# Patient Record
Sex: FEMALE | Employment: FULL TIME | URBAN - METROPOLITAN AREA
[De-identification: names, ages, dates, MRNs, and addresses within clinical notes are randomized per-mention and may not be internally consistent; named-entity substitution may affect disease eponyms.]

---

## 2018-10-18 ENCOUNTER — OFFICE VISIT (OUTPATIENT)
Dept: FAMILY MEDICINE CLINIC | Facility: CLINIC | Age: 38
End: 2018-10-18
Payer: COMMERCIAL

## 2018-10-18 VITALS
DIASTOLIC BLOOD PRESSURE: 68 MMHG | RESPIRATION RATE: 16 BRPM | TEMPERATURE: 99 F | SYSTOLIC BLOOD PRESSURE: 100 MMHG | OXYGEN SATURATION: 98 % | HEART RATE: 91 BPM

## 2018-10-18 DIAGNOSIS — H10.33 ACUTE CONJUNCTIVITIS OF BOTH EYES, UNSPECIFIED ACUTE CONJUNCTIVITIS TYPE: ICD-10-CM

## 2018-10-18 DIAGNOSIS — J01.00 ACUTE NON-RECURRENT MAXILLARY SINUSITIS: Primary | ICD-10-CM

## 2018-10-18 PROCEDURE — 99202 OFFICE O/P NEW SF 15 MIN: CPT | Performed by: PHYSICIAN ASSISTANT

## 2018-10-18 RX ORDER — ALBUTEROL SULFATE 90 UG/1
1-2 AEROSOL, METERED RESPIRATORY (INHALATION) EVERY 6 HOURS PRN
Qty: 1 INHALER | Refills: 0 | Status: SHIPPED | OUTPATIENT
Start: 2018-10-18

## 2018-10-18 RX ORDER — TOBRAMYCIN 3 MG/ML
2 SOLUTION/ DROPS OPHTHALMIC EVERY 6 HOURS
Qty: 5 ML | Refills: 0 | Status: SHIPPED | OUTPATIENT
Start: 2018-10-18 | End: 2019-01-13 | Stop reason: ALTCHOICE

## 2018-10-18 RX ORDER — AZITHROMYCIN 250 MG/1
TABLET, FILM COATED ORAL
Qty: 6 TABLET | Refills: 0 | Status: SHIPPED | OUTPATIENT
Start: 2018-10-18 | End: 2019-01-13 | Stop reason: ALTCHOICE

## 2018-10-18 RX ORDER — FLUTICASONE PROPIONATE 50 MCG
2 SPRAY, SUSPENSION (ML) NASAL DAILY
Qty: 1 INHALER | Refills: 0 | Status: SHIPPED | OUTPATIENT
Start: 2018-10-18 | End: 2019-01-13 | Stop reason: ALTCHOICE

## 2018-10-18 NOTE — PATIENT INSTRUCTIONS
-Sudafed  -Flonase  -Push fluids  -ER with worsening symptoms      Acute Bacterial Rhinosinusitis (ABRS)    Acute bacterial rhinosinusitis (ABRS) is an infection of your nasal cavity and sinuses. It’s caused by bacteria.  Acute means that you’ve had symptom corticosteroid medicine. Drops or spray used in the nose can lessen swelling and congestion. · Over-the-counter pain medicine. This is to lessen sinus pain and pressure. · Nasal decongestant medicine. Spray or drops may help to lessen congestion.  Do not

## 2018-10-18 NOTE — PROGRESS NOTES
CHIEF COMPLAINT:   Patient presents with:  Sinus Problem    HPI:   Ion Foster is a 45year old female who presents for sinus sxs for  Week and half. Patient also report crusty and red eyes x 2 days.    Patient reports nasal congestion,  Maxillary si HEAD: atraumatic, normocephalic.  + tenderness on palpation of maxillary sinuses. No tenderness on palpation of frontal sinuses.   EYES: conjunctiva erythematous, EOM intact and PATRICIA  BL  EARS: TM's not erythematous, no bulging, no retraction, no fluid, b Sig: Inhale 1-2 puffs into the lungs every 6 (six) hours as needed for Wheezing or Shortness of Breath. Risks, benefits, and side effects of medication explained and discussed.     Patient Instructions     -Sudafed  -Flonase  -Push fluids  -ER with ABRS may be diagnosed if you’ve had an upper respiratory infection like a cold and cough for 10 or more days without improvement or with worsening symptoms. Your healthcare provider will ask about your symptoms and your medical history.  The provider will c © 9534-2955 [employer] [address] [facility] [place] [address] [place]. All rights reserved. This information is not intended as a substitute for professional medical care. Always follow your healthcare professional's instructions.             The

## 2019-01-13 ENCOUNTER — OFFICE VISIT (OUTPATIENT)
Dept: FAMILY MEDICINE CLINIC | Facility: CLINIC | Age: 39
End: 2019-01-13
Payer: COMMERCIAL

## 2019-01-13 VITALS
HEART RATE: 86 BPM | HEIGHT: 67 IN | SYSTOLIC BLOOD PRESSURE: 110 MMHG | TEMPERATURE: 98 F | OXYGEN SATURATION: 96 % | DIASTOLIC BLOOD PRESSURE: 70 MMHG | BODY MASS INDEX: 21.97 KG/M2 | WEIGHT: 140 LBS | RESPIRATION RATE: 14 BRPM

## 2019-01-13 DIAGNOSIS — R05.9 COUGH IN ADULT PATIENT: ICD-10-CM

## 2019-01-13 DIAGNOSIS — J01.90 SUBACUTE SINUSITIS, UNSPECIFIED LOCATION: Primary | ICD-10-CM

## 2019-01-13 PROCEDURE — 99213 OFFICE O/P EST LOW 20 MIN: CPT | Performed by: NURSE PRACTITIONER

## 2019-01-13 RX ORDER — BENZONATATE 200 MG/1
200 CAPSULE ORAL 3 TIMES DAILY PRN
Qty: 30 CAPSULE | Refills: 0 | Status: SHIPPED | OUTPATIENT
Start: 2019-01-13 | End: 2019-01-23

## 2019-01-13 RX ORDER — CETIRIZINE HCL/PSEUDOEPHEDRINE 5 MG-120MG
1 TABLET, EXTENDED RELEASE 12 HR ORAL
Refills: 0 | COMMUNITY
Start: 2018-08-20

## 2019-01-13 RX ORDER — PREDNISONE 20 MG/1
40 TABLET ORAL DAILY
Qty: 10 TABLET | Refills: 0 | Status: SHIPPED | OUTPATIENT
Start: 2019-01-13 | End: 2019-01-18

## 2019-01-13 NOTE — PROGRESS NOTES
CHIEF COMPLAINT:   Patient presents with:  Ear Pain: left ear congestion  Sinus Problem  Cough        HPI:   René Barrera is a 45year old female who presents for sinus congestion and cough  for  2  weeks. Cough started gradually.   Occassionally is ti Smokeless tobacco: Never Used    Alcohol use: Yes      Comment: social    Drug use: No        REVIEW OF SYSTEMS:   GENERAL:See HPI  SKIN: no rashes, no hives.   EYES:denies blurred vision or double vision,   HEENT:  See HPI  CHEST: no chest pains, no pa • predniSONE 20 MG Oral Tab 10 tablet 0     Sig: Take 2 tablets (40 mg total) by mouth daily for 5 days. Take 2 tablets once a day with food. Avoid taking at nighttime.    • benzonatate 200 MG Oral Cap 30 capsule 0     Sig: Take 1 capsule (200 mg total) by Sinusitis can often be managed with self-care. Self-care can keep sinuses moist and make you feel more comfortable. Remember to follow your doctor's instructions closely. This can make a big difference in getting your sinus problem under control.   Drink fl The sinuses are air-filled spaces within the bones of the face. They connect to the inside of the nose. Sinusitis is an inflammation of the tissue that lines the sinuses.  Sinusitis can occur during a cold. It can also happen due to allergies to pollens and · Use acetaminophen or ibuprofen to control pain, unless another pain medicine was prescribed to you. If you have chronic liver or kidney disease or ever had a stomach ulcer, talk with your healthcare provider before using these medicines.  (Aspirin should If there is a lot of inflammation, air flow is restricted. The air passages may also go into spasm, especially if you have asthma. This causes wheezing and difficulty breathing even in people who do not have asthma. Bronchitis usually lasts 7 to 14 days. · If prescribed, finish all antibiotic medicine, even if you are feeling better after only a few days. Follow-up care  Follow up with your healthcare provider, or as advised. If you had an X-ray or ECG (electrocardiogram), a specialist will review it.  You

## 2019-01-13 NOTE — PATIENT INSTRUCTIONS
· May continue Zyrtec-D only in AM.  Can keep you awake if taken at night. · Restart flonase 2 sprays to each nostril at night for 2 weeks. · May use albuterol inhaler from October as directed as needed. · Hydrate! (cold or hot based on comfort).   Drinking extra fluids helps thin your mucus. This lets it drain from your sinuses more easily. Have a glass of water every hour or two. A humidifier helps in much the same way. Fluids can also offset the drying effects of certain medicines.  If you use a hu The sinuses are air-filled spaces within the bones of the face. They connect to the inside of the nose. Sinusitis is an inflammation of the tissue that lines the sinuses.  Sinusitis can occur during a cold. It can also happen due to allergies to pollens and · Use acetaminophen or ibuprofen to control pain, unless another pain medicine was prescribed to you. If you have chronic liver or kidney disease or ever had a stomach ulcer, talk with your healthcare provider before using these medicines.  (Aspirin should If there is a lot of inflammation, air flow is restricted. The air passages may also go into spasm, especially if you have asthma. This causes wheezing and difficulty breathing even in people who do not have asthma. Bronchitis usually lasts 7 to 14 days. · If prescribed, finish all antibiotic medicine, even if you are feeling better after only a few days. Follow-up care  Follow up with your healthcare provider, or as advised. If you had an X-ray or ECG (electrocardiogram), a specialist will review it.  You